# Patient Record
Sex: MALE | Race: WHITE | NOT HISPANIC OR LATINO | URBAN - NONMETROPOLITAN AREA
[De-identification: names, ages, dates, MRNs, and addresses within clinical notes are randomized per-mention and may not be internally consistent; named-entity substitution may affect disease eponyms.]

---

## 2022-11-21 ENCOUNTER — OFFICE VISIT (OUTPATIENT)
Dept: FAMILY MEDICINE CLINIC | Facility: CLINIC | Age: 52
End: 2022-11-21

## 2022-11-21 VITALS
WEIGHT: 231 LBS | HEIGHT: 73 IN | HEART RATE: 97 BPM | OXYGEN SATURATION: 97 % | BODY MASS INDEX: 30.62 KG/M2 | DIASTOLIC BLOOD PRESSURE: 72 MMHG | SYSTOLIC BLOOD PRESSURE: 142 MMHG

## 2022-11-21 DIAGNOSIS — Z12.11 SCREENING FOR COLON CANCER: ICD-10-CM

## 2022-11-21 DIAGNOSIS — N13.8 BPH WITH OBSTRUCTION/LOWER URINARY TRACT SYMPTOMS: ICD-10-CM

## 2022-11-21 DIAGNOSIS — R03.0 ELEVATED BP WITHOUT DIAGNOSIS OF HYPERTENSION: ICD-10-CM

## 2022-11-21 DIAGNOSIS — N40.1 BPH WITH OBSTRUCTION/LOWER URINARY TRACT SYMPTOMS: ICD-10-CM

## 2022-11-21 DIAGNOSIS — M54.41 ACUTE RIGHT-SIDED LOW BACK PAIN WITH RIGHT-SIDED SCIATICA: Primary | ICD-10-CM

## 2022-11-21 PROCEDURE — 99203 OFFICE O/P NEW LOW 30 MIN: CPT | Performed by: FAMILY MEDICINE

## 2022-11-21 RX ORDER — TAMSULOSIN HYDROCHLORIDE 0.4 MG/1
0.4 CAPSULE ORAL DAILY
COMMUNITY
Start: 2022-09-23

## 2022-11-21 RX ORDER — FINASTERIDE 5 MG/1
5 TABLET, FILM COATED ORAL DAILY
COMMUNITY
Start: 2022-11-16

## 2022-11-21 NOTE — PROGRESS NOTES
"Chief Complaint  Establish Care (Patient is here to establish care)    Subjective    History of Present Illness:  Mic Ivan is a 52 y.o. male who presents today to establish care and discuss recent problems with right-sided back pain and mild sciatica symptoms.    He works as a pharmacist for the [employer] and was at home about a month ago sealing his driveway and felt some pain along his right lower back that progressed with radiation into the right buttock and thigh.  Some initial foot weakness has improved.  No loss of bowel or bladder function.  No ED problems.    Mild blood pressure elevation today and he reports with weight gain and inactivity his blood pressure has risen a little over the years.  He plans to start working more regular exercise and get his weight back down in hopes this would improve blood pressure before need for medication.    History of BPH on Proscar and Flomax.  PSA up-to-date with urology.  He has had some recurrent infections and is considering a circumcision given he is uncircumcised.    Past due for screening colonoscopy and would like a referral to get this up-to-date.  No family history of colon cancer.    No family history of prostate cancer.    Declines COVID vaccination.    Had flu shot last month.    Discussed Tdap and Shingrix and he will consider.    Would like to return sometime in the next few months to get his full physical and fasting blood work up-to-date  Objective   Vital Signs:   /72 (BP Location: Left arm, Patient Position: Sitting, Cuff Size: Adult)   Pulse 97   Ht 185.4 cm (73\")   Wt 105 kg (231 lb)   SpO2 97%   BMI 30.48 kg/m²     Review of Systems   Constitutional: Negative for appetite change, chills and fever.   HENT: Negative for hearing loss.    Eyes: Negative for blurred vision.   Respiratory: Negative for chest tightness.    Cardiovascular: Negative for chest pain.   Gastrointestinal: Negative for abdominal pain. "   Musculoskeletal: Positive for back pain. Negative for gait problem.        Mild right sciatica.  See HPI   Skin: Negative for rash.   Psychiatric/Behavioral: Negative for depressed mood.       Past History:  Medical History: has no past medical history on file.   Surgical History: has no past surgical history on file.   Family History: family history is not on file.   Social History: reports that he has never smoked. He has never used smokeless tobacco. He reports that he does not drink alcohol and does not use drugs.      Current Outpatient Medications:   •  finasteride (PROSCAR) 5 MG tablet, Take 5 mg by mouth Daily., Disp: , Rfl:   •  tamsulosin (FLOMAX) 0.4 MG capsule 24 hr capsule, Take 0.4 mg by mouth Daily., Disp: , Rfl:     Allergies: Patient has no known allergies.    Physical Exam  Constitutional:       Appearance: Normal appearance.   HENT:      Head: Normocephalic.      Right Ear: External ear normal.      Left Ear: External ear normal.      Nose: Nose normal.   Eyes:      Pupils: Pupils are equal, round, and reactive to light.   Cardiovascular:      Rate and Rhythm: Normal rate and regular rhythm.      Heart sounds: Normal heart sounds.   Pulmonary:      Effort: Pulmonary effort is normal.      Breath sounds: Normal breath sounds.   Musculoskeletal:         General: Normal range of motion.      Cervical back: Normal range of motion.      Comments: Normal strength and sensation.  Negative straight leg raise.  Stable gait.  Right sciatica symptoms improving with conservative treatment   Skin:     General: Skin is warm and dry.   Neurological:      General: No focal deficit present.      Mental Status: He is alert.   Psychiatric:         Mood and Affect: Mood normal.         Behavior: Behavior normal.         Thought Content: Thought content normal.          Result Review                   Assessment and Plan  Diagnoses and all orders for this visit:    1. Acute right-sided low back pain with right-sided  sciatica (Primary)  Assessment & Plan:  Symptoms improving with conservative treatment.    Continue with stretching exercises and may use Aleve as needed.    We did discuss efforts with diet exercise and weight loss as a way to help prevent future sciatica flareups.    Recheck together at his physical or sooner if problems arise.  We did discuss if he has return of symptoms our plan would be to start physical therapy      2. BPH with obstruction/lower urinary tract symptoms  Assessment & Plan:  Stable control with Proscar and Flomax.  Screening PSA up-to-date with urology      3. Elevated BP without diagnosis of hypertension  Assessment & Plan:  See plan above      4. Screening for colon cancer  Assessment & Plan:  Scheduling past-due colonoscopy    Orders:  -     Ambulatory Referral For Screening Colonoscopy      BMI is >= 30 and <35. (Class 1 Obesity). The following options were offered after discussion;: exercise counseling/recommendations and nutrition counseling/recommendations          Follow Up  Return in about 4 weeks (around 12/19/2022) for Annual physical.    Brijesh Rai MD

## 2022-11-21 NOTE — ASSESSMENT & PLAN NOTE
Symptoms improving with conservative treatment.    Continue with stretching exercises and may use Aleve as needed.    We did discuss efforts with diet exercise and weight loss as a way to help prevent future sciatica flareups.    Recheck together at his physical or sooner if problems arise.  We did discuss if he has return of symptoms our plan would be to start physical therapy

## 2023-01-30 RX ORDER — SODIUM, POTASSIUM,MAG SULFATES 17.5-3.13G
1 SOLUTION, RECONSTITUTED, ORAL ORAL TAKE AS DIRECTED
Qty: 354 ML | Refills: 0 | Status: SHIPPED | OUTPATIENT
Start: 2023-01-30

## 2023-02-07 ENCOUNTER — OUTSIDE FACILITY SERVICE (OUTPATIENT)
Dept: GASTROENTEROLOGY | Facility: CLINIC | Age: 53
End: 2023-02-07
Payer: COMMERCIAL

## 2023-02-07 PROCEDURE — 45385 COLONOSCOPY W/LESION REMOVAL: CPT | Performed by: INTERNAL MEDICINE

## 2023-02-07 PROCEDURE — 45380 COLONOSCOPY AND BIOPSY: CPT | Performed by: INTERNAL MEDICINE

## 2023-02-10 ENCOUNTER — TELEPHONE (OUTPATIENT)
Dept: GASTROENTEROLOGY | Facility: CLINIC | Age: 53
End: 2023-02-10
Payer: COMMERCIAL

## 2023-02-10 NOTE — TELEPHONE ENCOUNTER
----- Message from Travis Figueroa MD sent at 2/10/2023 12:51 PM EST -----  Let Mr. Ivan know there were 3 adenoma type polyps.  He will need a repeat examination in 3 years.